# Patient Record
Sex: FEMALE | HISPANIC OR LATINO | URBAN - METROPOLITAN AREA
[De-identification: names, ages, dates, MRNs, and addresses within clinical notes are randomized per-mention and may not be internally consistent; named-entity substitution may affect disease eponyms.]

---

## 2017-01-04 PROBLEM — G93.40 ENCEPHALOPATHY ACUTE: Status: ACTIVE | Noted: 2017-01-04

## 2017-02-08 PROBLEM — C85.89 CNS LYMPHOMA (HCC): Status: ACTIVE | Noted: 2017-02-08

## 2017-02-28 PROBLEM — C85.80 LYMPHOMA MALIGNANT, LARGE CELL (HCC): Status: ACTIVE | Noted: 2017-02-28

## 2017-03-07 ENCOUNTER — IMPORTED ENCOUNTER (OUTPATIENT)
Dept: URBAN - METROPOLITAN AREA CLINIC 1 | Facility: CLINIC | Age: 67
End: 2017-03-07

## 2017-03-07 PROBLEM — H25.13: Noted: 2017-03-07

## 2017-03-07 PROBLEM — H43.813: Noted: 2017-03-14

## 2017-03-07 PROBLEM — H16.143: Noted: 2017-03-07

## 2017-03-07 PROBLEM — H04.123: Noted: 2017-03-07

## 2017-03-07 PROBLEM — Z09: Noted: 2017-03-07

## 2017-03-07 PROBLEM — H43.812: Noted: 2017-03-15

## 2017-03-07 PROBLEM — Z04.9: Noted: 2017-03-07

## 2017-03-07 PROBLEM — Z79.899: Noted: 2017-03-15

## 2017-03-07 PROCEDURE — 92083 EXTENDED VISUAL FIELD XM: CPT

## 2017-03-07 PROCEDURE — 92014 COMPRE OPH EXAM EST PT 1/>: CPT

## 2017-03-07 NOTE — PATIENT DISCUSSION
1.  High Risk Medication- Plaquenil without evidence of Toxicity or Plaquenil retinopathy. [id number] Bethanie Helm for patient to remain on Plaquenil. 2.  PVD w/o Tear OS-Patient was cautioned to call our office immediately if they experience a substantial change in their symptoms such as an increase in floaters persistent flashes loss of visual field (may appear as a shadow or a curtain) or decrease in visual acuity as these may indicate a retinal tear or detachment. 3.  Cataract OU: Observe for now without intervention. The patient was advised to contact us if any change or worsening of vision4. EDUARDO w/ PEK OU-The use/continuation of artificial tears were recommended. 5.  Return for an appointment in 1 year for 30 vf with Dr. Christophe Tucker.

## 2017-04-11 PROBLEM — C85.90 NHL (NON-HODGKIN'S LYMPHOMA) (HCC): Status: ACTIVE | Noted: 2017-04-11

## 2017-07-17 PROBLEM — D64.9 ANEMIA: Status: ACTIVE | Noted: 2017-07-17

## 2017-07-17 PROBLEM — M06.9 RA (RHEUMATOID ARTHRITIS) (HCC): Status: ACTIVE | Noted: 2017-07-17

## 2017-07-17 PROBLEM — R55 SYNCOPE: Status: ACTIVE | Noted: 2017-07-17

## 2017-07-17 PROBLEM — I95.1 ORTHOSTASIS: Status: ACTIVE | Noted: 2017-07-17

## 2017-07-17 PROBLEM — E22.2 SIADH (SYNDROME OF INAPPROPRIATE ADH PRODUCTION) (HCC): Status: ACTIVE | Noted: 2017-07-17

## 2017-07-17 PROBLEM — G93.89 ENCEPHALOMALACIA: Status: ACTIVE | Noted: 2017-07-17

## 2017-07-18 PROBLEM — I51.89 DIASTOLIC DYSFUNCTION: Status: ACTIVE | Noted: 2017-07-18

## 2018-07-10 ENCOUNTER — IMPORTED ENCOUNTER (OUTPATIENT)
Dept: URBAN - METROPOLITAN AREA CLINIC 1 | Facility: CLINIC | Age: 68
End: 2018-07-10

## 2018-07-10 PROBLEM — H43.812: Noted: 2018-07-10

## 2018-07-10 PROBLEM — H25.13: Noted: 2018-07-10

## 2018-07-10 PROBLEM — H16.143: Noted: 2018-07-10

## 2018-07-10 PROBLEM — H04.123: Noted: 2018-07-10

## 2018-07-10 PROCEDURE — 92015 DETERMINE REFRACTIVE STATE: CPT

## 2018-07-10 PROCEDURE — 92014 COMPRE OPH EXAM EST PT 1/>: CPT

## 2018-07-10 NOTE — PATIENT DISCUSSION
1.  Dry Eyes OU -- Patient to continue use of artificial tears were recommended. In addition Restasis was prescribed BID OU to increase the production of the patient's own tears. Cont tears during day and gel hs. Has Sjogrens syndrome2. Punctate Keratitis OU 3. PVD w/o Tear OD- RD precautions. 4.  Cataract OU: Observe for now without intervention. The patient was advised to contact us if any change or worsening of vision5. Return for an appointment in 1 year for 30 with Dr. Ayah Trivedi.

## 2018-12-06 NOTE — PATIENT DISCUSSION
S/P YAG OU: DOING WELL, FOLLOW. GLASSES RX PROVIDED FOR CRT BIFOCAL, SHOOTING MONOVISION GLASSES AND DAILY WEAR GLASSES.

## 2019-07-22 ENCOUNTER — IMPORTED ENCOUNTER (OUTPATIENT)
Dept: URBAN - METROPOLITAN AREA CLINIC 1 | Facility: CLINIC | Age: 69
End: 2019-07-22

## 2019-07-22 PROBLEM — H25.13: Noted: 2019-07-22

## 2019-07-22 PROBLEM — H43.811: Noted: 2019-07-22

## 2019-07-22 PROBLEM — H04.123: Noted: 2019-07-22

## 2019-07-22 PROCEDURE — 92014 COMPRE OPH EXAM EST PT 1/>: CPT

## 2019-07-22 PROCEDURE — 83861 MICROFLUID ANALY TEARS: CPT

## 2019-07-22 PROCEDURE — 92015 DETERMINE REFRACTIVE STATE: CPT

## 2019-07-22 NOTE — PATIENT DISCUSSION
1.  Dry Eyes OU -- Tear Lab shows mild dryness today. Patient to continue use of artificial tears were recommended. In addition Restasis was prescribed BID OU to increase the production of the patient's own tears. Refresh  6x day and ointment hs2. Cataract OU: Observe for now without intervention. The patient was advised to contact us if any change or worsening of vision3. PVD w/o Tear OD - RD precautions. 4.  Return for an appointment in 1 year for 30 with Dr. Estrellita Andrade.

## 2020-07-20 ENCOUNTER — IMPORTED ENCOUNTER (OUTPATIENT)
Dept: URBAN - METROPOLITAN AREA CLINIC 1 | Facility: CLINIC | Age: 70
End: 2020-07-20

## 2020-07-20 PROBLEM — H04.123: Noted: 2020-07-20

## 2020-07-20 PROBLEM — H43.813: Noted: 2020-07-20

## 2020-07-20 PROBLEM — H16.143: Noted: 2020-07-20

## 2020-07-20 PROBLEM — H25.13: Noted: 2020-07-20

## 2020-07-20 PROCEDURE — 92015 DETERMINE REFRACTIVE STATE: CPT

## 2020-07-20 PROCEDURE — 92014 COMPRE OPH EXAM EST PT 1/>: CPT

## 2020-07-20 NOTE — PATIENT DISCUSSION
1.  EDUARDO w/ PEK OU-Continue Restasis bid OURefresh celine HS  Refresh preservative free tears QID. Try gel drops OS2. Cataract OU: Observe for now without intervention. The patient was advised to contact us if any change or worsening of vision                                                                                                                                         3.  PVD OU - RD precautions. 4.  Return for an appointment in 1 month for 10 with Dr. Nuzhat Delgadillo.

## 2021-03-02 PROBLEM — J96.01 ACUTE HYPOXEMIC RESPIRATORY FAILURE (HCC): Status: ACTIVE | Noted: 2021-03-02

## 2021-03-02 PROBLEM — J81.0 FLASH PULMONARY EDEMA (HCC): Status: ACTIVE | Noted: 2021-03-02

## 2021-03-02 PROBLEM — I10 ACCELERATED HYPERTENSION: Status: ACTIVE | Noted: 2021-03-02

## 2021-03-03 PROBLEM — I50.43 ACUTE ON CHRONIC COMBINED SYSTOLIC AND DIASTOLIC HEART FAILURE (HCC): Chronic | Status: ACTIVE | Noted: 2017-07-18

## 2021-07-21 ENCOUNTER — IMPORTED ENCOUNTER (OUTPATIENT)
Dept: URBAN - METROPOLITAN AREA CLINIC 1 | Facility: CLINIC | Age: 71
End: 2021-07-21

## 2021-07-21 PROCEDURE — 92015 DETERMINE REFRACTIVE STATE: CPT

## 2021-07-21 PROCEDURE — 92014 COMPRE OPH EXAM EST PT 1/>: CPT

## 2021-07-21 NOTE — PATIENT DISCUSSION
2.  Cataract OU: Observe for now without intervention. The patient was advised to contact us if any change or worsening of vision1. EDUARDO w/ PEK OU-The use/continuation of artificial tears were recommended. qid gel hs3. Myopia: Rx was given for correction if indicated and requested. 4. Return for an appointment in 1 year for 30. with Dr. Christophe Tucker.

## 2021-07-21 NOTE — PATIENT DISCUSSION
1.  Cataract OU: Observe for now without intervention. The patient was advised to contact us if any change or worsening of vision2. PVD OU - RD precautions. 3.  EDUARDO w/ PEK OU-The use/continuation of artificial tears were recommended. qid gel hs4. Myopia: Rx was given for correction if indicated and requested. 5. Return for an appointment in 1 year for 30 with Dr. Alaina Sterling.

## 2021-07-23 PROBLEM — H43.813: Noted: 2021-07-23

## 2021-07-23 PROBLEM — H16.143: Noted: 2021-07-21

## 2021-07-23 PROBLEM — H52.13: Noted: 2021-07-21

## 2021-07-23 PROBLEM — H25.13: Noted: 2021-07-21

## 2021-07-23 PROBLEM — H04.123: Noted: 2021-07-21

## 2022-03-18 PROBLEM — C85.90 NHL (NON-HODGKIN'S LYMPHOMA) (HCC): Status: ACTIVE | Noted: 2017-04-11

## 2022-03-18 PROBLEM — J81.0 FLASH PULMONARY EDEMA (HCC): Status: ACTIVE | Noted: 2021-03-02

## 2022-03-18 PROBLEM — J96.01 ACUTE HYPOXEMIC RESPIRATORY FAILURE (HCC): Status: ACTIVE | Noted: 2021-03-02

## 2022-03-18 PROBLEM — I50.43 ACUTE ON CHRONIC COMBINED SYSTOLIC AND DIASTOLIC HEART FAILURE (HCC): Status: ACTIVE | Noted: 2017-07-18

## 2022-03-18 PROBLEM — C85.80 LYMPHOMA MALIGNANT, LARGE CELL (HCC): Status: ACTIVE | Noted: 2017-02-28

## 2022-03-19 PROBLEM — C85.89 CNS LYMPHOMA (HCC): Status: ACTIVE | Noted: 2017-02-08

## 2022-03-19 PROBLEM — G93.40 ENCEPHALOPATHY ACUTE: Status: ACTIVE | Noted: 2017-01-04

## 2022-03-19 PROBLEM — G93.89 ENCEPHALOMALACIA: Status: ACTIVE | Noted: 2017-07-17

## 2022-03-19 PROBLEM — C83.390 CNS LYMPHOMA: Status: ACTIVE | Noted: 2017-02-08

## 2022-03-19 PROBLEM — D64.9 ANEMIA: Status: ACTIVE | Noted: 2017-07-17

## 2022-03-19 PROBLEM — E22.2 SIADH (SYNDROME OF INAPPROPRIATE ADH PRODUCTION) (HCC): Status: ACTIVE | Noted: 2017-07-17

## 2022-03-19 PROBLEM — R55 SYNCOPE: Status: ACTIVE | Noted: 2017-07-17

## 2022-03-19 PROBLEM — I10 ACCELERATED HYPERTENSION: Status: ACTIVE | Noted: 2021-03-02

## 2022-03-20 PROBLEM — M06.9 RA (RHEUMATOID ARTHRITIS) (HCC): Status: ACTIVE | Noted: 2017-07-17

## 2022-03-20 PROBLEM — I95.1 ORTHOSTASIS: Status: ACTIVE | Noted: 2017-07-17

## 2022-04-08 ASSESSMENT — VISUAL ACUITY
OS_SC: 20/20
OD_SC: 20/30
OD_SC: 20/25
OS_SC: 20/40
OS_SC: 20/50
OD_SC: 20/25+1
OD_GLARE: 20/80
OS_SC: 20/30
OS_GLARE: 20/400
OD_GLARE: 20/60
OS_GLARE: 20/400
OS_SC: 20/30-1
OD_SC: 20/25
OD_SC: 20/25

## 2022-04-08 ASSESSMENT — TONOMETRY
OD_IOP_MMHG: 13
OD_IOP_MMHG: 15
OD_IOP_MMHG: 16
OD_IOP_MMHG: 18
OS_IOP_MMHG: 18
OD_IOP_MMHG: 12
OS_IOP_MMHG: 16
OS_IOP_MMHG: 13
OS_IOP_MMHG: 16
OS_IOP_MMHG: 12

## 2022-07-27 ENCOUNTER — COMPREHENSIVE EXAM (OUTPATIENT)
Dept: URBAN - METROPOLITAN AREA CLINIC 1 | Facility: CLINIC | Age: 72
End: 2022-07-27

## 2022-07-27 ASSESSMENT — VISUAL ACUITY
OS_CC: 20/40
OD_CC: 20/25
OD_BAT: 20/80
OS_BAT: 20/400

## 2022-07-27 ASSESSMENT — TONOMETRY
OS_IOP_MMHG: 12
OD_IOP_MMHG: 10

## 2022-10-07 RX ORDER — DULOXETIN HYDROCHLORIDE 30 MG/1
30 CAPSULE, DELAYED RELEASE ORAL DAILY
COMMUNITY

## 2022-10-07 RX ORDER — APREMILAST 30 MG/1
30 TABLET, FILM COATED ORAL
COMMUNITY

## 2022-10-07 RX ORDER — SPIRONOLACTONE 25 MG/1
25 TABLET ORAL DAILY
COMMUNITY

## 2022-10-07 RX ORDER — ZOLEDRONIC ACID 5 MG/100ML
5 INJECTION, SOLUTION INTRAVENOUS ONCE
COMMUNITY

## 2022-10-07 NOTE — PERIOP NOTES
PRE-SURGICAL INSTRUCTIONS        Patient's Name:  Jarod Vasquez      LDJTO'Z Date:  10/7/2022      Surgery Date:  10/12/2022                Do NOT eat or drink anything, including candy, gum, or ice chips after midnight on 10/12/22, unless you have specific instructions from your surgeon or anesthesia provider to do so. You may brush your teeth before coming to the hospital.  No smoking 24 hours prior to the day of surgery. No alcohol 24 hours prior to the day of surgery. No recreational drugs for one week prior to the day of surgery. Leave all valuables, including money/purse, at home. Remove all jewelry, nail polish, acrylic nails, and makeup (including mascara); no lotions powders, deodorant, or perfume/cologne/after shave on the skin. Follow instruction for Hibiclens washes and CHG wipes from surgeon's office. Glasses/contact lenses and dentures may be worn to the hospital.  They will be removed prior to surgery. Call your doctor if symptoms of a cold or illness develop within 24-48 hours prior to your surgery. 11.  If you are having an outpatient procedure, please make arrangements for a responsible ADULT TO [address] [place] [address] and stay with you for 24 hours after your surgery. 12. ONE VISITOR in the hospital at this time for outpatient procedures. Exceptions may be made for surgical admissions, per nursing unit guidelines      Special Instructions:      Bring list of CURRENT medications. Bring any pertinent legal medical records. Take these medications the morning of surgery with a sip of water:  per surgeon  Follow physician instructions about stopping anticoagulants. Complete bowel prep per MD instructions. On the day of surgery, come in the main entrance of [facility]. Let the  at the desk know you are there for surgery. A staff member will come escort you to the surgical area on the second floor.     If you have any questions or concerns, please do not hesitate to call:     (Prior to the day of surgery) [facility] department:  [phone]   (Day of surgery) Pre-Op department:  [phone]    These surgical instructions were reviewed with Concepción Milian during the [facility] phone call.

## 2022-10-11 ENCOUNTER — ANESTHESIA EVENT (OUTPATIENT)
Dept: ENDOSCOPY | Age: 72
End: 2022-10-11
Payer: MEDICARE

## 2022-10-12 ENCOUNTER — ANESTHESIA (OUTPATIENT)
Dept: ENDOSCOPY | Age: 72
End: 2022-10-12
Payer: MEDICARE

## 2022-10-12 ENCOUNTER — HOSPITAL ENCOUNTER (OUTPATIENT)
Age: 72
Setting detail: OUTPATIENT SURGERY
Discharge: HOME | End: 2022-10-12
Attending: INTERNAL MEDICINE | Admitting: INTERNAL MEDICINE
Payer: MEDICARE

## 2022-10-12 VITALS
HEIGHT: 58 IN | BODY MASS INDEX: 22.67 KG/M2 | DIASTOLIC BLOOD PRESSURE: 80 MMHG | WEIGHT: 108 LBS | TEMPERATURE: 97.1 F | HEART RATE: 66 BPM | RESPIRATION RATE: 17 BRPM | SYSTOLIC BLOOD PRESSURE: 122 MMHG | OXYGEN SATURATION: 98 %

## 2022-10-12 PROCEDURE — 77030008565 HC TBNG SUC IRR ERBE -B: Performed by: INTERNAL MEDICINE

## 2022-10-12 PROCEDURE — 88305 TISSUE EXAM BY PATHOLOGIST: CPT

## 2022-10-12 PROCEDURE — 00811 ANES LWR INTST NDSC NOS: CPT | Performed by: ANESTHESIOLOGY

## 2022-10-12 PROCEDURE — 77030013992 HC SNR POLYP ENDOSC BSC -B: Performed by: INTERNAL MEDICINE

## 2022-10-12 PROCEDURE — 2709999900 HC NON-CHARGEABLE SUPPLY: Performed by: INTERNAL MEDICINE

## 2022-10-12 PROCEDURE — 76040000007: Performed by: INTERNAL MEDICINE

## 2022-10-12 PROCEDURE — 00811 ANES LWR INTST NDSC NOS: CPT | Performed by: NURSE ANESTHETIST, CERTIFIED REGISTERED

## 2022-10-12 PROCEDURE — 99100 ANES PT EXTEME AGE<1 YR&>70: CPT | Performed by: ANESTHESIOLOGY

## 2022-10-12 PROCEDURE — 74011250636 HC RX REV CODE- 250/636: Performed by: NURSE ANESTHETIST, CERTIFIED REGISTERED

## 2022-10-12 PROCEDURE — 76060000032 HC ANESTHESIA 0.5 TO 1 HR: Performed by: INTERNAL MEDICINE

## 2022-10-12 PROCEDURE — 77030021593 HC FCPS BIOP ENDOSC BSC -A: Performed by: INTERNAL MEDICINE

## 2022-10-12 PROCEDURE — 74011250637 HC RX REV CODE- 250/637: Performed by: NURSE ANESTHETIST, CERTIFIED REGISTERED

## 2022-10-12 PROCEDURE — 99100 ANES PT EXTEME AGE<1 YR&>70: CPT | Performed by: NURSE ANESTHETIST, CERTIFIED REGISTERED

## 2022-10-12 RX ORDER — SODIUM CHLORIDE 0.9 % (FLUSH) 0.9 %
5-40 SYRINGE (ML) INJECTION EVERY 8 HOURS
Status: CANCELLED | OUTPATIENT
Start: 2022-10-12

## 2022-10-12 RX ORDER — LIDOCAINE HYDROCHLORIDE 10 MG/ML
0.1 INJECTION, SOLUTION EPIDURAL; INFILTRATION; INTRACAUDAL; PERINEURAL AS NEEDED
Status: DISCONTINUED | OUTPATIENT
Start: 2022-10-12 | End: 2022-10-12 | Stop reason: HOSPADM

## 2022-10-12 RX ORDER — FAMOTIDINE 20 MG/1
20 TABLET, FILM COATED ORAL ONCE
Status: COMPLETED | OUTPATIENT
Start: 2022-10-12 | End: 2022-10-12

## 2022-10-12 RX ORDER — ONDANSETRON 2 MG/ML
4 INJECTION INTRAMUSCULAR; INTRAVENOUS ONCE
Status: CANCELLED | OUTPATIENT
Start: 2022-10-12 | End: 2022-10-12

## 2022-10-12 RX ORDER — MAGNESIUM SULFATE 100 %
4 CRYSTALS MISCELLANEOUS AS NEEDED
Status: CANCELLED | OUTPATIENT
Start: 2022-10-12

## 2022-10-12 RX ORDER — HYDROMORPHONE HYDROCHLORIDE 2 MG/ML
0.5 INJECTION, SOLUTION INTRAMUSCULAR; INTRAVENOUS; SUBCUTANEOUS AS NEEDED
Status: CANCELLED | OUTPATIENT
Start: 2022-10-12

## 2022-10-12 RX ORDER — SODIUM CHLORIDE 0.9 % (FLUSH) 0.9 %
5-40 SYRINGE (ML) INJECTION AS NEEDED
Status: CANCELLED | OUTPATIENT
Start: 2022-10-12

## 2022-10-12 RX ORDER — DEXTROSE MONOHYDRATE 100 MG/ML
0-250 INJECTION, SOLUTION INTRAVENOUS AS NEEDED
Status: CANCELLED | OUTPATIENT
Start: 2022-10-12

## 2022-10-12 RX ORDER — SODIUM CHLORIDE, SODIUM LACTATE, POTASSIUM CHLORIDE, CALCIUM CHLORIDE 600; 310; 30; 20 MG/100ML; MG/100ML; MG/100ML; MG/100ML
75 INJECTION, SOLUTION INTRAVENOUS CONTINUOUS
Status: DISCONTINUED | OUTPATIENT
Start: 2022-10-12 | End: 2022-10-12 | Stop reason: HOSPADM

## 2022-10-12 RX ORDER — SODIUM CHLORIDE, SODIUM LACTATE, POTASSIUM CHLORIDE, CALCIUM CHLORIDE 600; 310; 30; 20 MG/100ML; MG/100ML; MG/100ML; MG/100ML
75 INJECTION, SOLUTION INTRAVENOUS CONTINUOUS
Status: CANCELLED | OUTPATIENT
Start: 2022-10-12 | End: 2022-10-16

## 2022-10-12 RX ORDER — PROPOFOL 10 MG/ML
INJECTION, EMULSION INTRAVENOUS AS NEEDED
Status: DISCONTINUED | OUTPATIENT
Start: 2022-10-12 | End: 2022-10-12 | Stop reason: HOSPADM

## 2022-10-12 RX ADMIN — PROPOFOL 50 MG: 10 INJECTION, EMULSION INTRAVENOUS at 12:36

## 2022-10-12 RX ADMIN — FAMOTIDINE 20 MG: 20 TABLET ORAL at 12:17

## 2022-10-12 RX ADMIN — PROPOFOL 50 MG: 10 INJECTION, EMULSION INTRAVENOUS at 12:28

## 2022-10-12 RX ADMIN — SODIUM CHLORIDE, POTASSIUM CHLORIDE, SODIUM LACTATE AND CALCIUM CHLORIDE 75 ML/HR: 600; 310; 30; 20 INJECTION, SOLUTION INTRAVENOUS at 12:17

## 2022-10-12 RX ADMIN — PROPOFOL 30 MG: 10 INJECTION, EMULSION INTRAVENOUS at 12:31

## 2022-10-12 NOTE — ANESTHESIA PREPROCEDURE EVALUATION
Relevant Problems   No relevant active problems       Anesthetic History   No history of anesthetic complications            Review of Systems / Medical History  Patient summary reviewed and pertinent labs reviewed    Pulmonary        Sleep apnea        Comments: Oral appliance for DEVON   Neuro/Psych   Within defined limits          Comments: H/O Brain tumour, resected  Seizures resolved Cardiovascular    Hypertension: well controlled      CHF          Comments: Cardiomyopathy secondary to Chemo.  EF 28%   GI/Hepatic/Renal  Within defined limits              Endo/Other        Arthritis and cancer     Other Findings   Comments: H/O NHL, resolved           Physical Exam    Airway  Mallampati: II  TM Distance: 4 - 6 cm  Neck ROM: normal range of motion   Mouth opening: Normal     Cardiovascular  Regular rate and rhythm,  S1 and S2 normal,  no murmur, click, rub, or gallop             Dental  No notable dental hx       Pulmonary                 Abdominal  GI exam deferred       Other Findings            Anesthetic Plan    ASA: 3  Anesthesia type: MAC          Induction: Intravenous  Anesthetic plan and risks discussed with: Patient

## 2022-10-12 NOTE — H&P
[email or web address]  [phone]      History and Physical    Patient: Timur Born MRN: [id number]  SSN: [id number]    YOB: 1950  Age: 71 y.o. Sex: female      Subjective:      Timur Born is a 71 y.o. female who is seen today for a follow-up visit. She reports an episode of nocturnal wakening and fecal incontinence recently (8/29/22) after both she and her  had what they suspect was food poisoning. Has been having urgency an frequency of stool for the past 4 months, mostly after eating, associated tenesmus. BMs are currently 3-6 times per day, no meds tried Denies fevers, weight loss, lower abdominal pain, or hematochezia. Last colonoscopy 5/12/20 was notable for a 12mm tubulovillous adenoma polyp and 3 year follow up colonoscopy recommended. No family history of colon cancer or IBD. Nubia Magallanes      Past Medical History:   Diagnosis Date    Arthritis     ra    Balance problem     Brain tumor ([address]     resolved with surgery    Cancer ([address]     non hodgkins lymphoma Remission    Cardiopathy     Chemo induced, EF 45% on ECHO on 9/1/2016    CHF (congestive heart failure) ([address]     h/o    Heart disease     Hypertension     Nasal obstruction     Neurological disorder     h/o seizures, 3-4, post crani, [id number], not current    Peripheral neuropathy, idiopathic     bilateral lower extremities    Psoriasis     Pulmonary edema     resolved    RA (rheumatoid arthritis) ([address]     Sjoegren syndrome     Sleep apnea     uses a mouth guard    STD (female)     h/o, not current    TMJ (temporomandibular joint syndrome)      Past Surgical History:   Procedure Laterality Date    HX CRANIOTOMY  2016    HX GI      colonoscopy    HX GYN  2005    partial hysterectomy    HX ORTHOPAEDIC Bilateral     bunionectomy     HX OTHER SURGICAL      partial thyroidectomy    NEUROLOGICAL PROCEDURE UNLISTED      NUCLEAR STRESS TEST  9/6/2016         CA BREAST SURGERY PROCEDURE UNLISTED      ruptured and removal    CA BREAST SURGERY PROCEDURE UNLISTED      biopsies fibrocystic      Family History   Problem Relation Age of Onset    Cancer Mother     Cancer Father      Social History     Tobacco Use    Smoking status: Never    Smokeless tobacco: Never   Substance Use Topics    Alcohol use: No      Prior to Admission medications    Medication Sig Start Date End Date Taking? Authorizing Provider   mirabegron ER (MYRBETRIQ) 25 mg ER tablet Take 25 mg by mouth daily. Yes Provider, Historical   spironolactone (ALDACTONE) 25 mg tablet Take 25 mg by mouth daily. Yes Provider, Historical   apremilast Bruno Barges) 30 mg tab Take 30 mg by mouth. Yes Provider, Historical   DULoxetine (CYMBALTA) 30 mg capsule Take 30 mg by mouth daily. Yes Provider, Historical   sacubitriL-valsartan (Entresto) 24-26 mg tablet Take 1 Tab by mouth two (2) times a day. Yes Provider, Historical   ALPRAZolam (XANAX) 0.25 mg tablet Take 0.25 mg by mouth nightly as needed for Anxiety. Yes Other, MD Toma   cholecalciferol (VITAMIN D3) (1000 Units /25 mcg) tablet Take 1,000 Units by mouth daily. Yes Provider, Historical   ferrous sulfate 325 mg (65 mg iron) tablet Take 65 mg by mouth Daily (before breakfast). Yes Provider, Historical   glucosamine/msm/chondrt/C/hyal (GLUCOSAMINE-CHONDROITIN-MSM PO) Take 1,500 mg by mouth two (2) times a day. Yes Provider, Historical   lacosamide (VIMPAT) 100 mg tab tablet Take 100 mg by mouth two (2) times a day. Yes Provider, Historical   estradioL (CLIMARA) 0.05 mg/24 hr 1 Patch by TransDERmal route every seven (7) days. Yes Provider, Historical   carvedilol (COREG) 6.25 mg tablet Take 1 Tab by mouth daily. Patient taking differently: Take 6.25 mg by mouth two (2) times daily (with meals). 7/18/17  Yes Jamal Tran MD   valACYclovir (VALTREX) 500 mg tablet Take 1 Tab by mouth daily.  7/18/17  Yes Jamal Tran MD   white petrolatum-mineral oiL (LACRILUBE S.O.P.) 56.8-42.5 % ointment Administer 1 Each to both eyes nightly. Yes Provider, Historical   cycloSPORINE (RESTASIS) 0.05 % dpet Administer 1 Drop to both eyes two (2) times a day. Yes Provider, Historical   carboxymethylcellulose sodium (REFRESH LIQUIGEL) 1 % dlgl ophthalmic solution Administer 1 Drop to both eyes five (5) times daily. Yes Provider, Historical   calcium-cholecalciferol, d3, 600-125 mg-unit tab Take 1 Tab by mouth two (2) times a day. Yes Provider, Historical   zoledronic acid (RECLAST) 5 mg/100 mL pgbk 5 mg by IntraVENous route once. yearly    Provider, Historical        No Known Allergies    Review of Systems:  A comprehensive review of systems was negative except for that written in the History of Present Illness. Objective:     Vitals:    10/07/22 0920 10/12/22 1202   BP:  (!) 153/87   Pulse:  83   Resp:  14   Temp:  98 °F (36.7 °C)   SpO2:  100%   Weight: 49 kg (108 lb) 49 kg (108 lb)   Height: 4' 10\" (1.473 m) 4' 10\" (1.473 m)        Physical Exam:  GENERAL: alert, cooperative, no distress, appears stated age  LUNG: clear to auscultation bilaterally  HEART: regular rate and rhythm, S1, S2 normal, no murmur, click, rub or gallop  ABDOMEN: soft, non-tender. Bowel sounds normal. No masses,  no organomegaly  NEUROLOGIC: alert & oriented x 3    Assessment:     Change in bowel habits  Fecal urgency  History of colon polyps    Plan:     Colonoscopy    Signed By: Juani Salgado MD     October 12, 2022      Juani Salgado MD  [facility], [address] [place] [address]  Cell - [phone]  [email or web address]

## 2022-10-12 NOTE — DISCHARGE INSTRUCTIONS
Colonoscopy: What to Expect at [address] [place] [address]  After a colonoscopy, you'll stay at the clinic until you wake up. Then you can go home. But you'll need to arrange for a ride. Your doctor will tell you when you can eat and do your other usual activities. Your doctor will talk to you about when you'll need your next colonoscopy. Your doctor can help you decide how often you need to be checked. This will depend on the results of your test and your risk for colorectal cancer. After the test, you may be bloated or have gas pains. You may need to pass gas. If a biopsy was done or a polyp was removed, you may have streaks of blood in your stool (feces) for a few days. Problems such as heavy rectal bleeding may not occur until several weeks after the test. This isn't common. But it can happen after polyps are removed. This care sheet gives you a general idea about how long it will take for you to recover. But each person recovers at a different pace. Follow the steps below to get better as quickly as possible. How can you care for yourself at home? Activity    Rest when you feel tired. You can do your normal activities when it feels okay to do so. Diet    Follow your doctor's directions for eating. Unless your doctor has told you not to, drink plenty of fluids. This helps to replace the fluids that were lost during the colon prep. Do not drink alcohol. Medicines    Your doctor will tell you if and when you can restart your medicines. You will also be given instructions about taking any new medicines. If you take aspirin or some other blood thinner, ask your doctor if and when to start taking it again. Make sure that you understand exactly what your doctor wants you to do. If polyps were removed or a biopsy was done during the test, your doctor may tell you not to take aspirin or other anti-inflammatory medicines for a few days. These include ibuprofen (Advil, Motrin) and naproxen (Aleve). Other instructions    For your safety, do not drive or operate machinery until the medicine wears off and you can think clearly. Your doctor may tell you not to drive or operate machinery until the day after your test.     Do not sign legal documents or make major decisions until the medicine wears off and you can think clearly. The anesthesia can make it hard for you to fully understand what you are agreeing to. Follow-up care is a key part of your treatment and safety. Be sure to make and go to all appointments, and call your doctor if you are having problems. It's also a good idea to know your test results and keep a list of the medicines you take. When should you call for help? Call 911 anytime you think you may need emergency care. For example, call if:    You passed out (lost consciousness). You pass maroon or bloody stools. You have trouble breathing. Call your doctor now or seek immediate medical care if:    You have pain that does not get better after you take pain medicine. You are sick to your stomach or cannot drink fluids. You have new or worse belly pain. You have blood in your stools. You have a fever. You cannot pass stools or gas. Watch closely for changes in your health, and be sure to contact your doctor if you have any problems. Where can you learn more? Go to http://www.gray.com/  Enter E264 in the search box to learn more about \"Colonoscopy: What to Expect at Home. \"  Current as of: September 8, 2021               Content Version: 13.2  © 2006-2022 Healthwise, Incorporated. Care instructions adapted under license by [employer] (which disclaims liability or warranty for this information). If you have questions about a medical condition or this instruction, always ask your healthcare professional. [place] [address] any warranty or liability for your use of this information.     DISCHARGE SUMMARY from Nurse     POST-PROCEDURE INSTRUCTIONS:    Call your Physician if you:  Observe any excess bleeding. Develop a temperature over 100.5o F. Experience abdominal, shoulder or chest pain. Notice any signs of decreased circulation or nerve impairment to an extremity such as a change in color, persistent numbness, tingling, coldness or increase in pain. Vomit blood or you have nausea and vomiting lasting longer than 4 hours. Are unable to take medications. Are unable to urinate within 8 hours after discharge following general anesthesia or intravenous sedation. For the next 24 hours after receiving general anesthesia or intravenous sedation, or while taking prescription Narcotics, limit your activities:  Do NOT drive a motor vehicle, operate hazard machinery or power tools, or perform tasks that require coordination. The medication you received during your procedure may have some effect on your mental awareness. Do NOT make important personal or business decisions. The medication you received during your procedure may have some effect on your mental awareness. Do NOT drink alcoholic beverages. These drinks do not mix well with the medications that have been given to you. Upon discharge from the hospital, you must be accompanied by a responsible adult. Resume your diet as directed by your physician. Resume medications as your physician has prescribed. Please give a list of your current medications to your Primary Care Provider. Please update this list whenever your medications are discontinued, doses are changed, or new medications (including over-the-counter products) are added. Please carry medication information at all times in case of emergency situations. These are general instructions for a healthy lifestyle:    No smoking/ No tobacco products/ Avoid exposure to second hand smoke.   Surgeon General's Warning:  Quitting smoking now greatly reduces serious risk to your health. Obesity, smoking, and a sedentary lifestyle greatly increase your risk for illness. A healthy diet, regular physical exercise & weight monitoring are important for maintaining a healthy lifestyle  You may be retaining fluid if you have a history of heart failure or if you experience any of the following symptoms:  Weight gain of 3 pounds or more overnight or 5 pounds in a week, increased swelling in our hands or feet or shortness of breath while lying flat in bed. Please call your doctor as soon as you notice any of these symptoms; do not wait until your next office visit. Recognize signs and symptoms of STROKE:  F  -  Face looks uneven  A  -  Arms unable to move or move unevenly  S  -  Speech slurred or non-existent  T  -  Time to call 911 - as soon as signs and symptoms begin - DO NOT go back to bed or wait to see If you get better - TIME IS BRAIN. Colorectal Screening  Colorectal cancer almost always develops from precancerous polyps (abnormal growths) in the colon or rectum. Screening tests can find precancerous polyps, so that they can be removed before they turn into cancer. Screening tests can also find colorectal cancer early, when treatment works best.  Speak with your physician about when you should begin screening and how often you should be tested. Postdeck Activation    Thank you for requesting access to [employer]. Please follow the instructions below to securely access and download your online medical record. Postdeck allows you to send messages to your doctor, view your test results, renew your prescriptions, schedule appointments, and more. How Do I Sign Up? In your internet browser, go to [email or web address]. Click on the First Time User? Click Here link in the Sign In box. You will see the New Member Sign Up page. Enter your Postdeck Access Code exactly as it appears below.  You will not need to use this code after youve completed the sign-up process. If you do not sign up before the expiration date, you must request a new code. Mindshapes Access Code: Activation code not generated  Current Mindshapes Status: Active (This is the date your eTutort access code will )    Enter the last four digits of your Social Security Number (xxxx) and Date of Birth (mm/dd/yyyy) as indicated and click Submit. You will be taken to the next sign-up page. Create a eTutort ID. This will be your Mindshapes login ID and cannot be changed, so think of one that is secure and easy to remember. Create a Mindshapes password. You can change your password at any time. Enter your Password Reset Question and Answer. This can be used at a later time if you forget your password. Enter your e-mail address. You will receive e-mail notification when new information is available in [address] Click Sign Up. You can now view and download portions of your medical record. Click the [employer] link to download a portable copy of your medical information. Additional Information    If you have questions, please call [phone]. Remember, Mindshapes is NOT to be used for urgent needs. For medical emergencies, dial 911. Educational references and/or instructions provided during this visit included:    See Attached      APPOINTMENTS:    Per MD Instruction    Discharge information has been reviewed with the patient. The patient verbalized understanding.

## 2022-10-12 NOTE — ANESTHESIA POSTPROCEDURE EVALUATION
Procedure(s):  COLONOSCOPY with bx's & polypectomy. MAC    Anesthesia Post Evaluation      Multimodal analgesia: multimodal analgesia used between 6 hours prior to anesthesia start to PACU discharge  Patient location during evaluation: bedside  Patient participation: complete - patient participated  Level of consciousness: awake  Pain management: adequate  Airway patency: patent  Anesthetic complications: no  Cardiovascular status: stable  Respiratory status: acceptable  Hydration status: acceptable  Post anesthesia nausea and vomiting:  controlled      INITIAL Post-op Vital signs:   Vitals Value Taken Time   BP 95/71 10/12/22 1320   Temp 37.1 °C (98.7 °F) 10/12/22 1303   Pulse 64 10/12/22 1327   Resp 24 10/12/22 1327   SpO2 98 % 10/12/22 1327   Vitals shown include unvalidated device data.

## 2022-10-12 NOTE — PROCEDURES
[email or web address]  [phone]        Brief Procedure Note    Alyssa Farrell  1950  [id number]    Date of Procedure: 10/12/2022    Preoperative diagnosis: Incontinence of feces, unspecified fecal incontinence type [R15.9]  Fecal urgency [R15.2]  Change in bowel habits [R19.4]  Personal history of colonic polyps [Z86.010]    Postoperative diagnosis: Random colon bx's r/o microscopic colitis, Transverse polyp x1, Rectal polyp x 1, diverticulosis, internal hemorrhoids     Description of Findings: same    Sedation/Anesthesia: Monitored Anesthesia Care; See Anesthesia Note    Procedure: Procedure(s):  COLONOSCOPY with bx's & polypectomy    :  Dr. Nikhil Calderon MD    Assistant(s): Endoscopy Technician-1: Christopher Castañeda Staff: Nancy Lares RN    EBL:None    Specimens:   ID Type Source Tests Collected by Time Destination   1 : random colon bxs Preservative Colon  Fifi Johnson MD 10/12/2022 1240 Pathology   2 : transverse polyp  Preservative Colon, Transverse  Fifi Johnson MD 10/12/2022 1243 Pathology   3 : rectal polyp  Preservative Rectal  Fifi Johnson MD 10/12/2022 1255 Pathology       Findings: See printed and scanned procedure note    Complications: None    Tissue Implant Device: None    Dr. Nikhil Calderon MD  10/12/2022  1:03 PM    Nikhil Calderon MD  [facility] 1946, [address] [place] [address] [phone]  [email or web address]

## 2023-08-02 ENCOUNTER — HOSPITAL ENCOUNTER (OUTPATIENT)
Facility: HOSPITAL | Age: 73
Discharge: HOME | End: 2023-08-05
Payer: MEDICARE

## 2023-08-02 DIAGNOSIS — E87.1 HYPOSMOLALITY SYNDROME: ICD-10-CM

## 2023-08-02 DIAGNOSIS — I10 ESSENTIAL HYPERTENSION, MALIGNANT: ICD-10-CM

## 2023-08-02 LAB
ALBUMIN SERPL-MCNC: 3.3 G/DL (ref 3.4–5)
ANION GAP SERPL CALC-SCNC: 7 MMOL/L (ref 3–18)
APPEARANCE UR: CLEAR
BACTERIA URNS QL MICRO: ABNORMAL /HPF
BILIRUB UR QL: NEGATIVE
BUN SERPL-MCNC: 11 MG/DL (ref 7–18)
BUN/CREAT SERPL: 21 (ref 12–20)
CALCIUM SERPL-MCNC: 8.9 MG/DL (ref 8.5–10.1)
CALCIUM SERPL-MCNC: 8.9 MG/DL (ref 8.5–10.1)
CHLORIDE SERPL-SCNC: 93 MMOL/L (ref 100–111)
CO2 SERPL-SCNC: 27 MMOL/L (ref 21–32)
COLOR UR: YELLOW
CREAT SERPL-MCNC: 0.52 MG/DL (ref 0.6–1.3)
CREAT UR-MCNC: 54 MG/DL (ref 30–125)
CREAT UR-MCNC: 55 MG/DL (ref 30–125)
EPITH CASTS URNS QL MICRO: ABNORMAL /LPF (ref 0–5)
ERYTHROCYTE [DISTWIDTH] IN BLOOD BY AUTOMATED COUNT: 12.3 % (ref 11.6–14.5)
GLUCOSE SERPL-MCNC: 95 MG/DL (ref 74–99)
GLUCOSE UR STRIP.AUTO-MCNC: NEGATIVE MG/DL
HCT VFR BLD AUTO: 34.5 % (ref 35–45)
HGB BLD-MCNC: 12.3 G/DL (ref 12–16)
HGB UR QL STRIP: ABNORMAL
KETONES UR QL STRIP.AUTO: NEGATIVE MG/DL
LEUKOCYTE ESTERASE UR QL STRIP.AUTO: ABNORMAL
MCH RBC QN AUTO: 33.9 PG (ref 24–34)
MCHC RBC AUTO-ENTMCNC: 35.7 G/DL (ref 31–37)
MCV RBC AUTO: 95 FL (ref 78–100)
MICROALBUMIN UR-MCNC: 1.17 MG/DL (ref 0–3)
MICROALBUMIN/CREAT UR-RTO: 21 MG/G (ref 0–30)
MUCOUS THREADS URNS QL MICRO: ABNORMAL /LPF
NITRITE UR QL STRIP.AUTO: NEGATIVE
NRBC # BLD: 0 K/UL (ref 0–0.01)
NRBC BLD-RTO: 0 PER 100 WBC
PH UR STRIP: 6.5 (ref 5–8)
PHOSPHATE SERPL-MCNC: 3.7 MG/DL (ref 2.5–4.9)
PLATELET # BLD AUTO: 319 K/UL (ref 135–420)
PMV BLD AUTO: 8.8 FL (ref 9.2–11.8)
POTASSIUM SERPL-SCNC: 4.3 MMOL/L (ref 3.5–5.5)
PROT UR STRIP-MCNC: NEGATIVE MG/DL
PROT UR-MCNC: 15 MG/DL
PTH-INTACT SERPL-MCNC: 25.4 PG/ML (ref 18.4–88)
RBC # BLD AUTO: 3.63 M/UL (ref 4.2–5.3)
RBC #/AREA URNS HPF: ABNORMAL /HPF (ref 0–5)
SODIUM SERPL-SCNC: 127 MMOL/L (ref 136–145)
SODIUM UR-SCNC: 25 MMOL/L (ref 20–110)
SP GR UR REFRACTOMETRY: 1.01 (ref 1–1.03)
T4 FREE SERPL-MCNC: 1.2 NG/DL (ref 0.7–1.5)
TSH SERPL DL<=0.05 MIU/L-ACNC: 0.46 UIU/ML (ref 0.36–3.74)
URATE SERPL-MCNC: 1.7 MG/DL (ref 2.6–7.2)
UROBILINOGEN UR QL STRIP.AUTO: 0.2 EU/DL (ref 0.2–1)
WBC # BLD AUTO: 3.1 K/UL (ref 4.6–13.2)
WBC URNS QL MICRO: ABNORMAL /HPF (ref 0–4)

## 2023-08-02 PROCEDURE — 84439 ASSAY OF FREE THYROXINE: CPT

## 2023-08-02 PROCEDURE — 83930 ASSAY OF BLOOD OSMOLALITY: CPT

## 2023-08-02 PROCEDURE — 82570 ASSAY OF URINE CREATININE: CPT

## 2023-08-02 PROCEDURE — 85027 COMPLETE CBC AUTOMATED: CPT

## 2023-08-02 PROCEDURE — 83521 IG LIGHT CHAINS FREE EACH: CPT

## 2023-08-02 PROCEDURE — 82043 UR ALBUMIN QUANTITATIVE: CPT

## 2023-08-02 PROCEDURE — 80069 RENAL FUNCTION PANEL: CPT

## 2023-08-02 PROCEDURE — 84165 PROTEIN E-PHORESIS SERUM: CPT

## 2023-08-02 PROCEDURE — 84300 ASSAY OF URINE SODIUM: CPT

## 2023-08-02 PROCEDURE — 83970 ASSAY OF PARATHORMONE: CPT

## 2023-08-02 PROCEDURE — 81001 URINALYSIS AUTO W/SCOPE: CPT

## 2023-08-02 PROCEDURE — 82533 TOTAL CORTISOL: CPT

## 2023-08-02 PROCEDURE — 36415 COLL VENOUS BLD VENIPUNCTURE: CPT

## 2023-08-02 PROCEDURE — 84550 ASSAY OF BLOOD/URIC ACID: CPT

## 2023-08-02 PROCEDURE — 84156 ASSAY OF PROTEIN URINE: CPT

## 2023-08-02 PROCEDURE — 83935 ASSAY OF URINE OSMOLALITY: CPT

## 2023-08-02 PROCEDURE — 84443 ASSAY THYROID STIM HORMONE: CPT

## 2023-08-03 LAB
CORTIS 1H P CHAL SERPL-MCNC: 9.7 UG/DL
KAPPA LC FREE SER-MCNC: 14.4 MG/L (ref 3.3–19.4)
KAPPA LC FREE/LAMBDA FREE SER: 1.26 (ref 0.26–1.65)
LAMBDA LC FREE SERPL-MCNC: 11.4 MG/L (ref 5.7–26.3)
OSMOLALITY SERPL: 264 MOSM/KG H2O (ref 280–300)
OSMOLALITY UR: 381 MOSM/KG H2O

## 2023-08-07 LAB
ALBUMIN SERPL ELPH-MCNC: 3.5 G/DL (ref 2.9–4.4)
ALBUMIN/GLOB SERPL: 1.1 (ref 0.7–1.7)
ALPHA1 GLOB SERPL ELPH-MCNC: 0.3 G/DL (ref 0–0.4)
ALPHA2 GLOB SERPL ELPH-MCNC: 0.9 G/DL (ref 0.4–1)
B-GLOBULIN SERPL ELPH-MCNC: 0.9 G/DL (ref 0.7–1.3)
GAMMA GLOB SERPL ELPH-MCNC: 1 G/DL (ref 0.4–1.8)
GLOBULIN SER CALC-MCNC: 3.1 G/DL (ref 2.2–3.9)
M PROTEIN SERPL ELPH-MCNC: NORMAL G/DL
PROT SERPL-MCNC: 6.6 G/DL (ref 6–8.5)

## 2023-09-19 ENCOUNTER — ANESTHESIA EVENT (OUTPATIENT)
Facility: HOSPITAL | Age: 73
End: 2023-09-19
Payer: MEDICARE

## 2023-09-20 ENCOUNTER — HOSPITAL ENCOUNTER (OUTPATIENT)
Facility: HOSPITAL | Age: 73
Setting detail: OUTPATIENT SURGERY
Discharge: HOME | End: 2023-09-20
Attending: INTERNAL MEDICINE | Admitting: INTERNAL MEDICINE
Payer: MEDICARE

## 2023-09-20 ENCOUNTER — ANESTHESIA (OUTPATIENT)
Facility: HOSPITAL | Age: 73
End: 2023-09-20
Payer: MEDICARE

## 2023-09-20 VITALS
HEART RATE: 69 BPM | HEIGHT: 58 IN | BODY MASS INDEX: 20.9 KG/M2 | RESPIRATION RATE: 15 BRPM | TEMPERATURE: 98.1 F | WEIGHT: 99.56 LBS | OXYGEN SATURATION: 96 % | DIASTOLIC BLOOD PRESSURE: 71 MMHG | SYSTOLIC BLOOD PRESSURE: 119 MMHG

## 2023-09-20 LAB
ANION GAP SERPL CALC-SCNC: 4 MMOL/L (ref 3–18)
BUN SERPL-MCNC: 22 MG/DL (ref 7–18)
BUN/CREAT SERPL: 37 (ref 12–20)
CALCIUM SERPL-MCNC: 9.1 MG/DL (ref 8.5–10.1)
CHLORIDE SERPL-SCNC: 100 MMOL/L (ref 100–111)
CO2 SERPL-SCNC: 30 MMOL/L (ref 21–32)
CREAT SERPL-MCNC: 0.6 MG/DL (ref 0.6–1.3)
GLUCOSE SERPL-MCNC: 89 MG/DL (ref 74–99)
POTASSIUM SERPL-SCNC: 3.7 MMOL/L (ref 3.5–5.5)
SODIUM SERPL-SCNC: 134 MMOL/L (ref 136–145)

## 2023-09-20 PROCEDURE — 3600007512: Performed by: INTERNAL MEDICINE

## 2023-09-20 PROCEDURE — 2709999900 HC NON-CHARGEABLE SUPPLY: Performed by: INTERNAL MEDICINE

## 2023-09-20 PROCEDURE — 88305 TISSUE EXAM BY PATHOLOGIST: CPT

## 2023-09-20 PROCEDURE — 2580000003 HC RX 258: Performed by: NURSE ANESTHETIST, CERTIFIED REGISTERED

## 2023-09-20 PROCEDURE — 3700000000 HC ANESTHESIA ATTENDED CARE: Performed by: INTERNAL MEDICINE

## 2023-09-20 PROCEDURE — 2500000003 HC RX 250 WO HCPCS: Performed by: NURSE ANESTHETIST, CERTIFIED REGISTERED

## 2023-09-20 PROCEDURE — 6360000002 HC RX W HCPCS: Performed by: NURSE ANESTHETIST, CERTIFIED REGISTERED

## 2023-09-20 PROCEDURE — 3700000001 HC ADD 15 MINUTES (ANESTHESIA): Performed by: INTERNAL MEDICINE

## 2023-09-20 PROCEDURE — 7100000010 HC PHASE II RECOVERY - FIRST 15 MIN: Performed by: INTERNAL MEDICINE

## 2023-09-20 PROCEDURE — 80048 BASIC METABOLIC PNL TOTAL CA: CPT

## 2023-09-20 PROCEDURE — 7100000000 HC PACU RECOVERY - FIRST 15 MIN: Performed by: INTERNAL MEDICINE

## 2023-09-20 PROCEDURE — 3600007502: Performed by: INTERNAL MEDICINE

## 2023-09-20 RX ORDER — LIDOCAINE HYDROCHLORIDE 10 MG/ML
1 INJECTION, SOLUTION EPIDURAL; INFILTRATION; INTRACAUDAL; PERINEURAL
Status: DISCONTINUED | OUTPATIENT
Start: 2023-09-20 | End: 2023-09-20 | Stop reason: HOSPADM

## 2023-09-20 RX ORDER — PHENYLEPHRINE HCL IN 0.9% NACL 1 MG/10 ML
SYRINGE (ML) INTRAVENOUS PRN
Status: DISCONTINUED | OUTPATIENT
Start: 2023-09-20 | End: 2023-09-20 | Stop reason: SDUPTHER

## 2023-09-20 RX ORDER — FAMOTIDINE 20 MG/1
20 TABLET, FILM COATED ORAL ONCE
Status: DISCONTINUED | OUTPATIENT
Start: 2023-09-20 | End: 2023-09-20 | Stop reason: HOSPADM

## 2023-09-20 RX ORDER — PROPOFOL 10 MG/ML
INJECTION, EMULSION INTRAVENOUS PRN
Status: DISCONTINUED | OUTPATIENT
Start: 2023-09-20 | End: 2023-09-20 | Stop reason: SDUPTHER

## 2023-09-20 RX ORDER — SODIUM CHLORIDE, SODIUM LACTATE, POTASSIUM CHLORIDE, CALCIUM CHLORIDE 600; 310; 30; 20 MG/100ML; MG/100ML; MG/100ML; MG/100ML
INJECTION, SOLUTION INTRAVENOUS CONTINUOUS
Status: DISCONTINUED | OUTPATIENT
Start: 2023-09-20 | End: 2023-09-20 | Stop reason: HOSPADM

## 2023-09-20 RX ORDER — LIDOCAINE HYDROCHLORIDE 20 MG/ML
INJECTION, SOLUTION EPIDURAL; INFILTRATION; INTRACAUDAL; PERINEURAL PRN
Status: DISCONTINUED | OUTPATIENT
Start: 2023-09-20 | End: 2023-09-20 | Stop reason: SDUPTHER

## 2023-09-20 RX ADMIN — LIDOCAINE HYDROCHLORIDE 50 MG: 20 INJECTION, SOLUTION EPIDURAL; INFILTRATION; INTRACAUDAL; PERINEURAL at 08:54

## 2023-09-20 RX ADMIN — SODIUM CHLORIDE, POTASSIUM CHLORIDE, SODIUM LACTATE AND CALCIUM CHLORIDE: 600; 310; 30; 20 INJECTION, SOLUTION INTRAVENOUS at 08:25

## 2023-09-20 RX ADMIN — PROPOFOL 10 MG: 10 INJECTION, EMULSION INTRAVENOUS at 08:56

## 2023-09-20 RX ADMIN — Medication 100 MCG: at 08:58

## 2023-09-20 RX ADMIN — Medication 100 MCG: at 09:06

## 2023-09-20 RX ADMIN — PROPOFOL 40 MG: 10 INJECTION, EMULSION INTRAVENOUS at 08:54

## 2023-09-20 RX ADMIN — PROPOFOL 20 MG: 10 INJECTION, EMULSION INTRAVENOUS at 08:55

## 2023-09-20 RX ADMIN — PROPOFOL 10 MG: 10 INJECTION, EMULSION INTRAVENOUS at 08:57

## 2023-09-20 RX ADMIN — PROPOFOL 10 MG: 10 INJECTION, EMULSION INTRAVENOUS at 08:58

## 2023-09-20 RX ADMIN — Medication 100 MCG: at 09:03

## 2023-09-20 ASSESSMENT — ENCOUNTER SYMPTOMS: SHORTNESS OF BREATH: 1

## 2023-09-20 ASSESSMENT — PAIN - FUNCTIONAL ASSESSMENT: PAIN_FUNCTIONAL_ASSESSMENT: NONE - DENIES PAIN

## 2023-09-20 NOTE — DISCHARGE INSTRUCTIONS
Sigmoidoscopy: What to Expect at [address] sigmoidoscopy lets your doctor look inside the lower part of your large intestine. This is also called the colon. The doctor uses a lighted tube called a sigmoidoscope (or scope). This test allows the doctor to look for small growths (called polyps), cancer, bleeding, hemorrhoids, or other problems. The doctor also may have used the scope to remove polyps. Or they may have used it to take tissue samples that need to be tested. You shouldn't have any pain after the procedure. But it is normal to pass gas. You may have mild discomfort from having gas. If your doctor removed polyps, you will likely need to schedule a colonoscopy to look at the whole colon. This care sheet gives you a general idea about how long it will take for you to recover. But each person recovers at a different pace. Follow the steps below to get better as quickly as possible. How can you care for yourself at home? Activity    Most people are able to return to work right away unless they have had a sedative during the procedure. You may need someone to drive you home if you have had a sedative. In most cases, you can drive yourself home. Diet    You can eat your normal diet. Be sure to drink plenty of liquids to replace those you have lost during the preparation for the procedure. Exercise    You can return to normal exercise right away. Medicine    Your doctor will tell you if and when you can restart your medicines. You also will be given instructions about taking any new medicines. If you stopped taking aspirin or some other blood thinner, your doctor will tell you when to start taking it again. Follow-up care is a key part of your treatment and safety. Be sure to make and go to all appointments, and call your doctor if you are having problems. It's also a good idea to know your test results and keep a list of the medicines you take.   When should you

## 2023-09-20 NOTE — H&P
[email or web address]  [phone]      History and Physical    Patient: Yousif Soriano MRN: [id number]  SSN: [id number]    YOB: 1950  Age: 71 y.o. Sex: female      Subjective:    Jessica Reardon is a 25-year-old female who is being seen today for a follow-up visit. She presents for follow-up of now 3 months severe persistent diarrhea. Symptoms began 5/20/2023 after having COVID booster. She was seen 6/1/2023, stool studies showed negative GI profile PCR, fecal calprotectin 95 (WNL). Since then, she tried taking Colestid up to 3 times a day but had difficulty with gagging on tablets, precipitating fecal incontinence. Currently taking Imodium 1 tablet/day in the morning with continued multiple episodes of nocturnal awakening and fecal incontinence. 5-10 stools per day. She has not tried increasing Imodium. She went to the ER last week for multiple concerns including chronic diarrhea and incontinence. A CT was performed which did not show any etiology for her symptoms. Labs were notable for hyponatremia at 129. She was placed on a ZIO monitor x2 weeks due to syncope. 2/2023:  She was seen 9/26/2022 by NP Eliud Corona for evaluation of fecal incontinence, change in bowel movements x 4 months. 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 A colonoscopy was performed 10/12/2022 which showed TA x1, normal random colon biopsies. 5-year repeat was recommended for colon cancer screening. She was prescribed Xifaxan, denied by insurance however patient was provided samples and completed the medication. She reports significant improvement after taking medication and making diet changes. Since then, she reports 2-3 bowel movements per day, occasionally has a small formed stool in between her complete bowel movements. She denies tenesmus, incontinence, urgency or frequency.  She has been taking [place]' colon health probiotic

## 2023-09-20 NOTE — ANESTHESIA POSTPROCEDURE EVALUATION
Department of Anesthesiology  Postprocedure Note    Patient: Sid Retana  MRN: [id number]  YOB: 1950  Date of evaluation: 9/20/2023      Procedure Summary     Date: 09/20/23 Room / Location: [facility] END[facility] 03 / [facility]    Anesthesia Start: 0849 Anesthesia Stop: 1100    Procedure: Maame Jackson with bxs (Abdomen) Diagnosis:       Diarrhea, unspecified type      Incontinence of feces, unspecified fecal incontinence type      (Diarrhea, unspecified type [R19.7])      (Incontinence of feces, unspecified fecal incontinence type [R15.9])    Surgeons: Sushila Chopra MD Responsible Provider: Suzanne Miranda MD    Anesthesia Type: MAC ASA Status: 3          Anesthesia Type: MAC    Mable Phase I: Mable Score: 10    Mable Phase II: Mable Score: 10      Anesthesia Post Evaluation    Patient location during evaluation: bedside  Patient participation: complete - patient participated  Level of consciousness: awake  Airway patency: patent  Nausea & Vomiting: no nausea  Complications: no  Cardiovascular status: hemodynamically stable  Respiratory status: acceptable  Hydration status: euvolemic  Multimodal analgesia pain management approach  Pain management: adequate

## 2023-09-20 NOTE — ANESTHESIA POSTPROCEDURE EVALUATION
Department of Anesthesiology  Postprocedure Note    Patient: Nixon Razo  MRN: [id number]  YOB: 1950  Date of evaluation: 9/20/2023      Procedure Summary     Date: 09/20/23 Room / Location: [facility] END[facility] 03 / [facility]    Anesthesia Start: 0849 Anesthesia Stop: 8221    Procedure: Lenor Abbey with bxs (Abdomen) Diagnosis:       Diarrhea, unspecified type      Incontinence of feces, unspecified fecal incontinence type      (Diarrhea, unspecified type [R19.7])      (Incontinence of feces, unspecified fecal incontinence type [R15.9])    Surgeons: Gloria Geller MD Responsible Provider: Alejandro Funk MD    Anesthesia Type: MAC ASA Status: 3          Anesthesia Type: MAC    Mable Phase I: Mable Score: 10    Mable Phase II: Mable Score: 10      Anesthesia Post Evaluation    Patient location during evaluation: bedside  Patient participation: complete - patient participated  Level of consciousness: awake  Airway patency: patent  Nausea & Vomiting: no nausea  Complications: no  Cardiovascular status: hemodynamically stable  Respiratory status: acceptable  Hydration status: euvolemic  Multimodal analgesia pain management approach  Pain management: adequate

## 2024-09-27 ENCOUNTER — PROCEDURE VISIT (OUTPATIENT)
Age: 74
End: 2024-09-27

## 2024-09-27 VITALS
BODY MASS INDEX: 22.67 KG/M2 | TEMPERATURE: 98.2 F | RESPIRATION RATE: 18 BRPM | HEART RATE: 69 BPM | WEIGHT: 108 LBS | OXYGEN SATURATION: 97 % | HEIGHT: 58 IN

## 2024-09-27 DIAGNOSIS — R20.2 NUMBNESS AND TINGLING IN BOTH HANDS: Primary | ICD-10-CM

## 2024-09-27 DIAGNOSIS — G62.9 NEUROPATHY: ICD-10-CM

## 2024-09-27 DIAGNOSIS — R20.0 NUMBNESS AND TINGLING IN BOTH HANDS: Primary | ICD-10-CM

## 2024-09-27 DIAGNOSIS — R94.131 ABNORMAL EMG: ICD-10-CM

## 2024-10-08 ENCOUNTER — CLINICAL DOCUMENTATION (OUTPATIENT)
Facility: HOSPITAL | Age: 74
End: 2024-10-08

## 2024-10-08 NOTE — PROGRESS NOTES
needed) Active.  fluocinonide  (0.05% cream, topical as needed) Active.  Medications Reconciled     Health Maintenance History (Lisa Grijalva; 10/8/2024 12:51 PM)  Mammogram, Screening   [02/01/2023]: Normal.  Colonoscopy, Screening   [10/12/2022]: Normal.        Review of Systems (Talib Irby MD; 10/8/2024 2:01 PM)  General Not Present- Anorexia, Chills, Fatigue and Fever.  Skin Not Present- Bruising, Pruritus, Rash and Ulcer.  HEENT Not Present- Dry Mucous Membranes, Dysgeusia, Oral Ulcers, Periorbital Puffiness and Sore Throat.  Respiratory Not Present- Cough, Difficulty Breathing on Exertion, Dyspnea and Hemoptysis.  Cardiovascular Not Present- Chest Pain, Claudications, Orthopnea, Palpitations, Paroxysmal Nocturnal Dyspnea and Swelling of Extremities.  Gastrointestinal Not Present- Abdominal Pain, Abdominal Swelling, Constipation, Diarrhea, Hematochezia, Melena, Nausea and Vomiting.  Female Genitourinary Not Present- Blood in Urine, Difficulty Emptying Bladder, Dysuria, Frequency, Hematuria and Nocturia.  Musculoskeletal Not Present- Joint Pain, Joint Redness, Joint Stiffness, Joint Swelling, Leg Cramps and Myalgia.  Neurological Not Present- Dizziness, Headaches, Syncope and Trouble walking.  Endocrine Not Present- Appetite Changes, Excessive Thirst, Polydipsia and Polyuria.  Hematology Not Present- Easy Bruising and Excessive bleeding.    Vitals (Lisa Rudi; 10/8/2024 12:57 PM)  10/8/2024 12:57 PM  Weight: 105 lb   Height: 57.25 in   Body Surface Area: 1.37 m²   Body Mass Index: 22.52 kg/m²                Physical Exam (Talib Irby MD; 10/8/2024 2:01 PM)  The physical exam findings are as follows:  Note:  phone appt    Chest and Lung Exam  Auscultation  Breath sounds - Clear.    Cardiovascular  Auscultation  Rhythm - Regular. Heart Sounds - Normal heart sounds.    Abdomen  Palpation/Percussion  Palpation and Percussion of the abdomen reveal - Soft, Non Tender and No

## (undated) DEVICE — SYR 20ML LL STRL LF --

## (undated) DEVICE — SOLUTION IRRIG 1000ML H2O STRL BLT

## (undated) DEVICE — SYR 10ML LUER LOK 1/5ML GRAD --

## (undated) DEVICE — SYRINGE MED 50ML LUERSLIP TIP

## (undated) DEVICE — GAUZE,SPONGE,4"X4",16PLY,STRL,LF,10/TRAY: Brand: MEDLINE

## (undated) DEVICE — SYR 50ML SLIP TIP NSAF LF STRL --

## (undated) DEVICE — CANNULA ORIG TL CLR W FOAM CUSHIONS AND 14FT SUPL TB 3 CHN

## (undated) DEVICE — SYRINGE MED 10ML LUERLOCK TIP W/O SFTY DISP

## (undated) DEVICE — CANNULA NSL AD TBNG L14FT STD PVC O2 CRV CONN NONFLARED NSL

## (undated) DEVICE — CATHETER SUCT TR FL TIP 14FR W/ O CTRL

## (undated) DEVICE — GOWN PLASTIC FILM THMBHKS UNIV BLUE: Brand: CARDINAL HEALTH

## (undated) DEVICE — FORCEPS BX L240CM JAW DIA2.8MM L CAP W/ NDL MIC MESH TOOTH

## (undated) DEVICE — YANKAUER,SMOOTH HANDLE,HIGH CAPACITY: Brand: MEDLINE INDUSTRIES, INC.

## (undated) DEVICE — SNARE POLYP M W27MMXL240CM OVL STIFF DISP CAPTIVATOR

## (undated) DEVICE — SYRINGE MED 25GA 3ML L5/8IN SUBQ PLAS W/ DETACH NDL SFTY

## (undated) DEVICE — ENDOSCOPY PUMP TUBING/ CAP SET: Brand: ERBE

## (undated) DEVICE — LINER SUCT CANSTR 3000CC PLAS SFT PRE ASSEMB W/OUT TBNG W/

## (undated) DEVICE — MEDI-VAC NON-CONDUCTIVE SUCTION TUBING: Brand: CARDINAL HEALTH

## (undated) DEVICE — FLUFF AND POLYMER UNDERPAD,EXTRA HEAVY: Brand: WINGS

## (undated) DEVICE — SYRINGE 20ML LL S/C 50

## (undated) DEVICE — UNDERPAD INCONT W23XL36IN STD BLU POLYPR BK FLUF SFT

## (undated) DEVICE — GOWN ISOL IMPERV UNIV, DISP, OPEN BACK, BLUE --